# Patient Record
Sex: MALE | Race: OTHER | Employment: FULL TIME | URBAN - METROPOLITAN AREA
[De-identification: names, ages, dates, MRNs, and addresses within clinical notes are randomized per-mention and may not be internally consistent; named-entity substitution may affect disease eponyms.]

---

## 2017-06-04 ENCOUNTER — HOSPITAL ENCOUNTER (OUTPATIENT)
Age: 37
Discharge: HOME | End: 2017-06-04
Payer: COMMERCIAL

## 2017-06-04 VITALS
TEMPERATURE: 98 F | DIASTOLIC BLOOD PRESSURE: 86 MMHG | SYSTOLIC BLOOD PRESSURE: 120 MMHG | RESPIRATION RATE: 16 BRPM | WEIGHT: 200 LBS | OXYGEN SATURATION: 99 % | HEIGHT: 67 IN | BODY MASS INDEX: 31.39 KG/M2 | HEART RATE: 97 BPM

## 2017-06-04 DIAGNOSIS — A09 TRAVELER'S DIARRHEA: Primary | ICD-10-CM

## 2017-06-04 PROCEDURE — 87335 E COLI 0157 AG IA: CPT | Performed by: EMERGENCY MEDICINE

## 2017-06-04 PROCEDURE — 87046 STOOL CULTR AEROBIC BACT EA: CPT | Performed by: EMERGENCY MEDICINE

## 2017-06-04 PROCEDURE — 87045 FECES CULTURE AEROBIC BACT: CPT | Performed by: EMERGENCY MEDICINE

## 2017-06-04 PROCEDURE — 99203 OFFICE O/P NEW LOW 30 MIN: CPT

## 2017-06-04 PROCEDURE — 99204 OFFICE O/P NEW MOD 45 MIN: CPT

## 2017-06-04 PROCEDURE — 87077 CULTURE AEROBIC IDENTIFY: CPT | Performed by: EMERGENCY MEDICINE

## 2017-06-04 RX ORDER — CIPROFLOXACIN 500 MG/1
500 TABLET, FILM COATED ORAL 2 TIMES DAILY
Qty: 6 TABLET | Refills: 0 | Status: SHIPPED | OUTPATIENT
Start: 2017-06-04 | End: 2017-06-07

## 2017-06-04 NOTE — ED INITIAL ASSESSMENT (HPI)
C/o having fever for 2 days Diarrhea for 2 days Denies Abd pain Came back from [place] 5 days ago.  Took Loperamide and paracetamol

## 2017-06-04 NOTE — ED PROVIDER NOTES
Patient Seen in: [facility] In [address] [place] [address] Raheel    History   Patient presents with:  Fever  Nausea/Vomiting/Diarrhea (gastrointestinal)    Stated Complaint: Fever    HPI    Patient is a 48-year-old who returned from [place] 5 days ago and now send a stool culture and treat with Cipro      Disposition and Plan     Clinical Impression:  Traveler's diarrhea  (primary encounter diagnosis)    Disposition:  Discharge    Follow-up:  Cameron Mcintyre MD  [facility]  [phone]

## 2017-06-17 NOTE — PROGRESS NOTES
Quick Note:    Inform patient of result. She will need four more days of cipro. Rx for Ciprofloxacin 500mg PO BID, Disp #8.  Refills 0.  ______

## 2017-08-07 ENCOUNTER — OFFICE VISIT (OUTPATIENT)
Dept: INTERNAL MEDICINE CLINIC | Facility: CLINIC | Age: 37
End: 2017-08-07

## 2017-08-07 ENCOUNTER — APPOINTMENT (OUTPATIENT)
Dept: LAB | Facility: HOSPITAL | Age: 37
End: 2017-08-07
Attending: INTERNAL MEDICINE
Payer: COMMERCIAL

## 2017-08-07 VITALS
WEIGHT: 234 LBS | HEIGHT: 67 IN | DIASTOLIC BLOOD PRESSURE: 78 MMHG | SYSTOLIC BLOOD PRESSURE: 112 MMHG | BODY MASS INDEX: 36.73 KG/M2 | HEART RATE: 88 BPM | TEMPERATURE: 99 F

## 2017-08-07 DIAGNOSIS — Z76.89 ENCOUNTER TO ESTABLISH CARE WITH NEW DOCTOR: Primary | ICD-10-CM

## 2017-08-07 DIAGNOSIS — R10.9 FLANK PAIN: ICD-10-CM

## 2017-08-07 LAB
ANION GAP SERPL CALC-SCNC: 7 MMOL/L (ref 0–18)
BUN SERPL-MCNC: 10 MG/DL (ref 8–20)
BUN/CREAT SERPL: 11 (ref 10–20)
CALCIUM SERPL-MCNC: 9.5 MG/DL (ref 8.5–10.5)
CHLORIDE SERPL-SCNC: 103 MMOL/L (ref 95–110)
CO2 SERPL-SCNC: 27 MMOL/L (ref 22–32)
CREAT SERPL-MCNC: 0.91 MG/DL (ref 0.5–1.5)
GLUCOSE SERPL-MCNC: 110 MG/DL (ref 70–99)
OSMOLALITY UR CALC.SUM OF ELEC: 284 MOSM/KG (ref 275–295)
POTASSIUM SERPL-SCNC: 3.8 MMOL/L (ref 3.3–5.1)
SODIUM SERPL-SCNC: 137 MMOL/L (ref 136–144)

## 2017-08-07 PROCEDURE — 99212 OFFICE O/P EST SF 10 MIN: CPT | Performed by: INTERNAL MEDICINE

## 2017-08-07 PROCEDURE — 36415 COLL VENOUS BLD VENIPUNCTURE: CPT

## 2017-08-07 PROCEDURE — 80048 BASIC METABOLIC PNL TOTAL CA: CPT

## 2017-08-07 PROCEDURE — 99204 OFFICE O/P NEW MOD 45 MIN: CPT | Performed by: INTERNAL MEDICINE

## 2017-08-07 NOTE — PROGRESS NOTES
Raisa Nichols is a 39year old malePatient presents with:  Establish Care      HPI:     Raisa Nichols is a 39year old male who presents for a complete physical exam.     No PMH. No PSH, had root canal.   NKDA.      Returned from travelling to apparent distress  HEENT: normal oropharynx without erythema or exudate, normal TM's  EYES: PERRLA, EOMI, conjunctivae pink  NECK: supple, no cervical or supraclavicular lymphadenopathy, no carotid bruits, no thyromegaly  LUNGS: clear to auscultation  CARD

## 2017-08-08 ENCOUNTER — TELEPHONE (OUTPATIENT)
Dept: INTERNAL MEDICINE CLINIC | Facility: CLINIC | Age: 37
End: 2017-08-08

## 2017-08-08 NOTE — TELEPHONE ENCOUNTER
Please notify of normal kidney function. Would advise that he start up again with exercise and make sure he drinks enough water.  Let me know if this pain occurs again

## 2017-08-08 NOTE — TELEPHONE ENCOUNTER
Notified patient of Dr. Savanna Santamaria message and instructions. Patient verbalized understanding and agreement with plan.

## 2017-11-27 ENCOUNTER — OFFICE VISIT (OUTPATIENT)
Dept: INTERNAL MEDICINE CLINIC | Facility: CLINIC | Age: 37
End: 2017-11-27

## 2017-11-27 VITALS
DIASTOLIC BLOOD PRESSURE: 70 MMHG | SYSTOLIC BLOOD PRESSURE: 116 MMHG | WEIGHT: 234 LBS | BODY MASS INDEX: 36.73 KG/M2 | RESPIRATION RATE: 18 BRPM | OXYGEN SATURATION: 99 % | HEIGHT: 67 IN | TEMPERATURE: 98 F | HEART RATE: 84 BPM

## 2017-11-27 DIAGNOSIS — R42 VERTIGO: ICD-10-CM

## 2017-11-27 DIAGNOSIS — M94.0 COSTOCHONDRITIS: Primary | ICD-10-CM

## 2017-11-27 PROCEDURE — 99212 OFFICE O/P EST SF 10 MIN: CPT | Performed by: INTERNAL MEDICINE

## 2017-11-27 PROCEDURE — 99213 OFFICE O/P EST LOW 20 MIN: CPT | Performed by: INTERNAL MEDICINE

## 2017-11-28 NOTE — PROGRESS NOTES
Mckenzie Ramos is a 44year old male. Patient presents with:  Abdominal Pain      HPI:   Mckenzie Ramos is a 44year old male who presents for: L sided abdominal pain, vertigo    Is having L upper abdominal pain intermittently.  Has not had any epi normal S1S2, no gallops or murmurs  ABD: soft, nontender, nondistended, NABS. No HSM. No epigastric discomfort. MSK: tenderness to palpation of anterior L 10th rib, no crepitus noted. ASSESSMENT AND PLAN:     1.  Costochondritis  Advised ibuprofen 4

## 2017-12-02 ENCOUNTER — LAB ENCOUNTER (OUTPATIENT)
Dept: LAB | Facility: HOSPITAL | Age: 37
End: 2017-12-02
Attending: INTERNAL MEDICINE
Payer: COMMERCIAL

## 2017-12-02 DIAGNOSIS — Z00.00 ROUTINE GENERAL MEDICAL EXAMINATION AT A HEALTH CARE FACILITY: Primary | ICD-10-CM

## 2017-12-02 PROCEDURE — 80061 LIPID PANEL: CPT

## 2017-12-02 PROCEDURE — 82947 ASSAY GLUCOSE BLOOD QUANT: CPT

## 2017-12-02 PROCEDURE — 36415 COLL VENOUS BLD VENIPUNCTURE: CPT

## 2017-12-08 ENCOUNTER — TELEPHONE (OUTPATIENT)
Dept: INTERNAL MEDICINE CLINIC | Facility: CLINIC | Age: 37
End: 2017-12-08

## 2017-12-08 NOTE — TELEPHONE ENCOUNTER
Please notify patient that cholesterol and glucose are ok, but are borderline high. Would recommend that he cut back on carbohydrates (breads, rice, pasta, potatoes, sweets) by 50%.  His cholesterol could be better also, would cut back about 25% on fried or

## 2017-12-11 NOTE — TELEPHONE ENCOUNTER
Patient called back. Relayed Dr Tip Houston message and instructions to him. He verbalized understanding.

## 2018-06-29 NOTE — LETTER
[address]  [address] [facility]  Dept: [phone]  Dept Fax: [phone]: [phone]      Date & Time: 6/20/2017, 9:19 AM      Dear Di Snyder,    We have received test results pertaining DISCHARGE

## 2018-10-15 ENCOUNTER — OFFICE VISIT (OUTPATIENT)
Dept: INTERNAL MEDICINE CLINIC | Facility: CLINIC | Age: 38
End: 2018-10-15
Payer: COMMERCIAL

## 2018-10-15 VITALS
OXYGEN SATURATION: 99 % | BODY MASS INDEX: 37.77 KG/M2 | TEMPERATURE: 98 F | HEART RATE: 88 BPM | SYSTOLIC BLOOD PRESSURE: 104 MMHG | WEIGHT: 235 LBS | DIASTOLIC BLOOD PRESSURE: 74 MMHG | HEIGHT: 66 IN

## 2018-10-15 DIAGNOSIS — Z00.00 ANNUAL PHYSICAL EXAM: Primary | ICD-10-CM

## 2018-10-15 PROCEDURE — 99395 PREV VISIT EST AGE 18-39: CPT | Performed by: INTERNAL MEDICINE

## 2018-10-15 NOTE — PROGRESS NOTES
Supriya Hawkins is a 44year old malePatient presents with:  Physical: annual      HPI:     Supriya Hawkins is a 44year old male who presents for a complete physical exam.     PMH: GERD (takes med PAN D as needed)    No PSH. NKDA.    Social histo thyromegaly  LUNGS: clear to auscultation  CARDIO: RRR, normal S1S2, no gallops or murmurs  GI: soft, NT, ND, NABS, no HSM  EXTREMITIES: no edema, +2 radial & DP pulses bilaterally  NEURO: A&O x 3, moves all 4 extremities normally      ASSESSMENT AND PLAN:

## 2018-10-17 ENCOUNTER — TELEPHONE (OUTPATIENT)
Dept: INTERNAL MEDICINE CLINIC | Facility: CLINIC | Age: 38
End: 2018-10-17

## 2018-10-17 NOTE — TELEPHONE ENCOUNTER
Pt reports drowsiness, tired for 2-3 wks. Comes and goes in the evening. Pt has orders for lipid, cmp.  To Dr. Corinne Bonilla, please advise

## 2018-10-17 NOTE — TELEPHONE ENCOUNTER
Forgot to mention at 10/15 OV drowsiness he experiences in the evenings     Pt would like call back from Dr Memo Goldsmith [phone]

## 2018-10-17 NOTE — TELEPHONE ENCOUNTER
Suspect that this may be related to the weather and that it is getting darker sooner. Also high carb meal in the evening can also make you feel tired sooner.

## 2018-10-21 ENCOUNTER — APPOINTMENT (OUTPATIENT)
Dept: LAB | Facility: HOSPITAL | Age: 38
End: 2018-10-21
Attending: INTERNAL MEDICINE
Payer: COMMERCIAL

## 2018-10-21 DIAGNOSIS — Z00.00 ANNUAL PHYSICAL EXAM: ICD-10-CM

## 2018-10-21 PROCEDURE — 80061 LIPID PANEL: CPT

## 2018-10-21 PROCEDURE — 36415 COLL VENOUS BLD VENIPUNCTURE: CPT

## 2018-10-21 PROCEDURE — 80053 COMPREHEN METABOLIC PANEL: CPT

## 2018-10-24 ENCOUNTER — TELEPHONE (OUTPATIENT)
Dept: INTERNAL MEDICINE CLINIC | Facility: CLINIC | Age: 38
End: 2018-10-24

## 2018-10-24 NOTE — TELEPHONE ENCOUNTER
Please notify patient that labs look good. His HDL is low (this is the good cholesterol and we want this higher). I would encourage him to exercise regularly and that should help. His cholesterol is better than last time, so I think his diet is working.

## 2018-10-24 NOTE — TELEPHONE ENCOUNTER
Nurse spoke to patient to relay message from MD.    Patient verbalized understanding. Patient requested office to mail him his lab results. Nurse put envelope in out going mail at [address].

## 2018-11-01 ENCOUNTER — OFFICE VISIT (OUTPATIENT)
Dept: INTERNAL MEDICINE CLINIC | Facility: CLINIC | Age: 38
End: 2018-11-01
Payer: COMMERCIAL

## 2018-11-01 VITALS
WEIGHT: 238 LBS | HEIGHT: 67 IN | BODY MASS INDEX: 37.35 KG/M2 | DIASTOLIC BLOOD PRESSURE: 82 MMHG | HEART RATE: 84 BPM | TEMPERATURE: 98 F | SYSTOLIC BLOOD PRESSURE: 118 MMHG

## 2018-11-01 DIAGNOSIS — R10.11 COLICKY RUQ ABDOMINAL PAIN: Primary | ICD-10-CM

## 2018-11-01 PROCEDURE — 99214 OFFICE O/P EST MOD 30 MIN: CPT | Performed by: INTERNAL MEDICINE

## 2018-11-01 PROCEDURE — 99212 OFFICE O/P EST SF 10 MIN: CPT | Performed by: INTERNAL MEDICINE

## 2018-11-01 NOTE — PROGRESS NOTES
Darren Broderick is a 44year old male. Patient presents with:  Checkup: right sided chest pain      HPI:   Darren Broderick is a 44year old male who presents for:  R sided chest discomfort. Started having pains about 2 weeks ago.  They hosted s normal S1S2, no gallops or murmurs  Pain not reproducible to palpation  RUQ/epigastric tenderness to palpation with radiation to R scalpula. NABS. Round abdomen. Nondistended. ASSESSMENT AND PLAN:     1.  Colicky RUQ abdominal pain  Pain slightly bett

## 2019-01-16 ENCOUNTER — OFFICE VISIT (OUTPATIENT)
Dept: INTERNAL MEDICINE CLINIC | Facility: CLINIC | Age: 39
End: 2019-01-16
Payer: COMMERCIAL

## 2019-01-16 VITALS
HEART RATE: 98 BPM | SYSTOLIC BLOOD PRESSURE: 112 MMHG | BODY MASS INDEX: 36.91 KG/M2 | WEIGHT: 235.19 LBS | TEMPERATURE: 99 F | OXYGEN SATURATION: 98 % | HEIGHT: 67 IN | DIASTOLIC BLOOD PRESSURE: 80 MMHG

## 2019-01-16 DIAGNOSIS — J06.9 URI, ACUTE: Primary | ICD-10-CM

## 2019-01-16 PROCEDURE — 99212 OFFICE O/P EST SF 10 MIN: CPT | Performed by: INTERNAL MEDICINE

## 2019-01-16 PROCEDURE — 99213 OFFICE O/P EST LOW 20 MIN: CPT | Performed by: INTERNAL MEDICINE

## 2019-01-16 RX ORDER — AZITHROMYCIN 250 MG/1
TABLET, FILM COATED ORAL
Qty: 6 TABLET | Refills: 0 | Status: SHIPPED | OUTPATIENT
Start: 2019-01-16 | End: 2019-12-11

## 2019-01-17 NOTE — PROGRESS NOTES
Antonio Abbott is a 43year old male. HPI:   Patient presents with:  Cold: c/o 99.0 Sunday, productive cough (light green phlegm), no chills, or body aches, n/v/d. Had a sore throat, but not resolved since using lozenges. Tried Lozenges.        43 y ASSESSMENT/PLAN:   URI  Zithromax (Z-kassie) as directed x5d           Orders This Visit:  No orders of the defined types were placed in this encounter.       Meds This Visit:  Requested Prescriptions     Signed Prescriptions Disp Refills   • azithromycin (Z

## 2019-12-09 ENCOUNTER — OFFICE VISIT (OUTPATIENT)
Dept: INTERNAL MEDICINE CLINIC | Facility: CLINIC | Age: 39
End: 2019-12-09
Payer: COMMERCIAL

## 2019-12-09 VITALS
WEIGHT: 232.63 LBS | DIASTOLIC BLOOD PRESSURE: 82 MMHG | TEMPERATURE: 99 F | OXYGEN SATURATION: 98 % | HEIGHT: 64 IN | SYSTOLIC BLOOD PRESSURE: 120 MMHG | HEART RATE: 116 BPM | BODY MASS INDEX: 39.72 KG/M2

## 2019-12-09 DIAGNOSIS — R50.9 FEVER, UNSPECIFIED FEVER CAUSE: ICD-10-CM

## 2019-12-09 DIAGNOSIS — Z00.00 ANNUAL PHYSICAL EXAM: Primary | ICD-10-CM

## 2019-12-09 PROCEDURE — 99396 PREV VISIT EST AGE 40-64: CPT | Performed by: INTERNAL MEDICINE

## 2019-12-09 RX ORDER — OSELTAMIVIR PHOSPHATE 75 MG/1
75 CAPSULE ORAL 2 TIMES DAILY
Qty: 10 CAPSULE | Refills: 0 | Status: SHIPPED | OUTPATIENT
Start: 2019-12-09 | End: 2020-08-13

## 2019-12-09 NOTE — PROGRESS NOTES
Darius Ganser is a 35year old malePatient presents with:  Physical  Cough: Fever, prodcutive cough, body aches x 1 day      HPI:     Darius Ganser is a 35year old male who presents for a complete physical exam.     PMH: GERD (takes med PAN D depression  SKIN: denies lesions, rashes or wounds    EXAM:   /82 (BP Location: Right arm, Patient Position: Sitting, Cuff Size: large)   Pulse 116   Temp 98.8 °F (37.1 °C) (Oral)   Ht 5' 4\" (1.626 m)   Wt 232 lb 9.6 oz (105.5 kg)   SpO2 98%   BMI 3

## 2019-12-11 ENCOUNTER — TELEPHONE (OUTPATIENT)
Dept: INTERNAL MEDICINE CLINIC | Facility: CLINIC | Age: 39
End: 2019-12-11

## 2019-12-11 RX ORDER — BENZONATATE 200 MG/1
200 CAPSULE ORAL 3 TIMES DAILY PRN
Qty: 30 CAPSULE | Refills: 0 | Status: SHIPPED | OUTPATIENT
Start: 2019-12-11 | End: 2020-08-13

## 2019-12-11 NOTE — TELEPHONE ENCOUNTER
Dr. Leona Michael, I spoke with patient and he says he is gradually improving. He did not have a fever today. He is eating ok. He is concerned about his cough. Cough is bad and inconvenient because he feels like he is continually coughing.  He says a prescription fo

## 2019-12-11 NOTE — TELEPHONE ENCOUNTER
rx for tessalon sent; would also recommend cepacol throat lozenges, lots of liquids; he can call if he is not getting better

## 2019-12-14 ENCOUNTER — LAB ENCOUNTER (OUTPATIENT)
Dept: LAB | Facility: HOSPITAL | Age: 39
End: 2019-12-14
Attending: INTERNAL MEDICINE
Payer: COMMERCIAL

## 2019-12-14 DIAGNOSIS — Z12.5 SCREENING FOR PROSTATE CANCER: ICD-10-CM

## 2019-12-14 DIAGNOSIS — Z00.00 ANNUAL PHYSICAL EXAM: Primary | ICD-10-CM

## 2019-12-14 PROCEDURE — 80053 COMPREHEN METABOLIC PANEL: CPT

## 2019-12-14 PROCEDURE — 36415 COLL VENOUS BLD VENIPUNCTURE: CPT

## 2019-12-14 PROCEDURE — 80061 LIPID PANEL: CPT

## 2019-12-14 PROCEDURE — 85025 COMPLETE CBC W/AUTO DIFF WBC: CPT

## 2020-04-10 ENCOUNTER — PATIENT MESSAGE (OUTPATIENT)
Dept: INTERNAL MEDICINE CLINIC | Facility: CLINIC | Age: 40
End: 2020-04-10

## 2020-04-13 ENCOUNTER — TELEPHONE (OUTPATIENT)
Dept: INTERNAL MEDICINE CLINIC | Facility: CLINIC | Age: 40
End: 2020-04-13

## 2020-04-13 ENCOUNTER — PATIENT MESSAGE (OUTPATIENT)
Dept: INTERNAL MEDICINE CLINIC | Facility: CLINIC | Age: 40
End: 2020-04-13

## 2020-04-13 NOTE — TELEPHONE ENCOUNTER
Amy Nguyen   to [place], [place], DO            4/13/20 3:50 AM   Jelani Mccoy,   Good morning.      As an update about my health, on Friday afternoon after having lunch, I got high fever all of the sudden with a temperature of 100 as I mentioned in my p

## 2020-04-13 NOTE — TELEPHONE ENCOUNTER
From: Jani Urbano  To: Bryce Smith DO  Sent: 4/13/2020 3:50 AM CDT  Subject: Other    Hi ,  Good morning.     As an update about my health, on Friday afternoon after having lunch, I got high fever all of the sudden with a temperature of 100 a

## 2020-04-17 ENCOUNTER — PATIENT MESSAGE (OUTPATIENT)
Dept: INTERNAL MEDICINE CLINIC | Facility: CLINIC | Age: 40
End: 2020-04-17

## 2020-04-28 RX ORDER — OMEPRAZOLE 40 MG/1
40 CAPSULE, DELAYED RELEASE ORAL DAILY
Qty: 90 CAPSULE | Refills: 1 | Status: SHIPPED | OUTPATIENT
Start: 2020-04-28 | End: 2020-11-11

## 2020-04-28 RX ORDER — CETIRIZINE HYDROCHLORIDE 10 MG/1
10 TABLET, CHEWABLE ORAL DAILY
Qty: 90 TABLET | Refills: 1 | Status: SHIPPED | OUTPATIENT
Start: 2020-04-28 | End: 2020-11-11

## 2020-08-11 ENCOUNTER — TELEPHONE (OUTPATIENT)
Dept: INTERNAL MEDICINE CLINIC | Facility: CLINIC | Age: 40
End: 2020-08-11

## 2020-08-11 NOTE — TELEPHONE ENCOUNTER
Pt company prescribed him Pantoprazole 40mg  Because Omeprazole is not helping. Was told to check with  if he can take this medication.

## 2020-11-08 NOTE — PATIENT INSTRUCTIONS
- You were seen in clinic for regular annual check-up. Please call your insurance company for coverage of blood work. If covered, we can order blood work for you and you will be a repeat of last year's tests.   –We have refilled your levocetirizine 5 mg d

## 2020-11-08 NOTE — H&P
Festussheri Marsh is a 35year old male. HPI:   Patient presents with:  Physical    Mr. Griselda Link is a 35year old male coming in for annual physical examination. March 20th got sick but improved.  When he goes out and comes home, he has some body aches o Palpitations  Respiratory: Cough, Sputum, Wheezing, Shortness of breath  Gastrointestinal: Nausea, Vomiting, Diarrhea, Constipation, Pain, Heartburn, Dysphagia, Bloody stools, Tarry stools  Genitourinary: Dysmenorrhea, Dysuria, Urinary Frequency, Hematuria cervical/axillary/inguinal LAD      DATA REVIEWED   Labs:  I reviewed the last set of labs from 12/14/2019  - CMP: unremarkable  - Lipid panel: HDL 30  - PSA: 0.29  - CBC WNL      ASSESSMENT/PLAN:       Mr. Nicole Ashley is a 35year old male coming in for annual PM

## 2020-11-11 ENCOUNTER — OFFICE VISIT (OUTPATIENT)
Dept: INTERNAL MEDICINE CLINIC | Facility: CLINIC | Age: 40
End: 2020-11-11
Payer: COMMERCIAL

## 2020-11-11 VITALS
DIASTOLIC BLOOD PRESSURE: 90 MMHG | SYSTOLIC BLOOD PRESSURE: 120 MMHG | TEMPERATURE: 98 F | HEART RATE: 102 BPM | WEIGHT: 247.38 LBS | HEIGHT: 65.9 IN | BODY MASS INDEX: 40.23 KG/M2 | OXYGEN SATURATION: 99 %

## 2020-11-11 DIAGNOSIS — Z00.00 ROUTINE GENERAL MEDICAL EXAMINATION AT A HEALTH CARE FACILITY: Primary | ICD-10-CM

## 2020-11-11 DIAGNOSIS — Z12.5 SPECIAL SCREENING FOR MALIGNANT NEOPLASM OF PROSTATE: ICD-10-CM

## 2020-11-11 DIAGNOSIS — Z13.0 SCREENING FOR IRON DEFICIENCY ANEMIA: ICD-10-CM

## 2020-11-11 DIAGNOSIS — Z13.220 SCREENING FOR LIPOID DISORDERS: ICD-10-CM

## 2020-11-11 PROCEDURE — 3080F DIAST BP >= 90 MM HG: CPT | Performed by: INTERNAL MEDICINE

## 2020-11-11 PROCEDURE — 3074F SYST BP LT 130 MM HG: CPT | Performed by: INTERNAL MEDICINE

## 2020-11-11 PROCEDURE — 3008F BODY MASS INDEX DOCD: CPT | Performed by: INTERNAL MEDICINE

## 2020-11-11 PROCEDURE — 99396 PREV VISIT EST AGE 40-64: CPT | Performed by: INTERNAL MEDICINE

## 2020-11-11 RX ORDER — LEVOCETIRIZINE DIHYDROCHLORIDE 5 MG/1
5 TABLET, FILM COATED ORAL EVERY EVENING
Qty: 30 TABLET | Refills: 3 | Status: SHIPPED | OUTPATIENT
Start: 2020-11-11

## 2020-11-25 ENCOUNTER — LAB ENCOUNTER (OUTPATIENT)
Dept: LAB | Facility: HOSPITAL | Age: 40
End: 2020-11-25
Attending: INTERNAL MEDICINE
Payer: COMMERCIAL

## 2020-11-25 ENCOUNTER — TELEPHONE (OUTPATIENT)
Dept: INTERNAL MEDICINE CLINIC | Facility: CLINIC | Age: 40
End: 2020-11-25

## 2020-11-25 DIAGNOSIS — E78.5 DYSLIPIDEMIA: ICD-10-CM

## 2020-11-25 DIAGNOSIS — R74.8 ELEVATED LIVER ENZYMES: ICD-10-CM

## 2020-11-25 DIAGNOSIS — Z13.0 SCREENING FOR IRON DEFICIENCY ANEMIA: ICD-10-CM

## 2020-11-25 DIAGNOSIS — Z13.220 SCREENING FOR LIPOID DISORDERS: ICD-10-CM

## 2020-11-25 DIAGNOSIS — Z00.00 ROUTINE GENERAL MEDICAL EXAMINATION AT A HEALTH CARE FACILITY: ICD-10-CM

## 2020-11-25 DIAGNOSIS — R73.01 ELEVATED FASTING BLOOD SUGAR: Primary | ICD-10-CM

## 2020-11-25 DIAGNOSIS — R73.01 ELEVATED FASTING BLOOD SUGAR: ICD-10-CM

## 2020-11-25 DIAGNOSIS — Z12.5 SPECIAL SCREENING FOR MALIGNANT NEOPLASM OF PROSTATE: ICD-10-CM

## 2020-11-25 PROCEDURE — 80061 LIPID PANEL: CPT

## 2020-11-25 PROCEDURE — 36415 COLL VENOUS BLD VENIPUNCTURE: CPT

## 2020-11-25 PROCEDURE — 83036 HEMOGLOBIN GLYCOSYLATED A1C: CPT

## 2020-11-25 PROCEDURE — 84153 ASSAY OF PSA TOTAL: CPT

## 2020-11-25 PROCEDURE — 85025 COMPLETE CBC W/AUTO DIFF WBC: CPT

## 2020-11-25 PROCEDURE — 80053 COMPREHEN METABOLIC PANEL: CPT

## 2020-11-25 NOTE — TELEPHONE ENCOUNTER
Brief telephone note: 2 identifiers used    Patient stated he had a late dinner last night around 11 PM, and this may affect his labs. Reviewed the blood work from 11/25/2020  –CMP: Fasting sugar level high at 153, AST 40, ALT 80  –Lipid panel:  Total ch

## 2020-11-27 ENCOUNTER — TELEPHONE (OUTPATIENT)
Dept: INTERNAL MEDICINE CLINIC | Facility: CLINIC | Age: 40
End: 2020-11-27

## 2020-11-27 DIAGNOSIS — E11.9 DIABETES MELLITUS, NEW ONSET (HCC): Primary | ICD-10-CM

## 2020-11-27 RX ORDER — LANCETS 30 GAUGE
EACH MISCELLANEOUS
Qty: 100 EACH | Refills: 3 | Status: SHIPPED | OUTPATIENT
Start: 2020-11-27

## 2020-11-27 RX ORDER — BLOOD-GLUCOSE METER
KIT MISCELLANEOUS
Qty: 1 DEVICE | Refills: 0 | Status: SHIPPED | OUTPATIENT
Start: 2020-11-27

## 2020-11-27 RX ORDER — BLOOD-GLUCOSE METER
KIT MISCELLANEOUS
Qty: 100 STRIP | Refills: 3 | Status: SHIPPED | OUTPATIENT
Start: 2020-11-27

## 2020-11-27 RX ORDER — BLOOD PRESSURE TEST KIT
KIT MISCELLANEOUS
Qty: 120 EACH | Refills: 3 | Status: SHIPPED | OUTPATIENT
Start: 2020-11-27

## 2020-11-27 NOTE — TELEPHONE ENCOUNTER
Brief telephone note: Follow-up with hemoglobin A1c 7.2%. Confirmed date of birth. Patient electing to lower lifestyle modifications including improvement of diet and continued home exercises.   I emphasized the need for targeted weight loss to bring d

## 2021-10-04 NOTE — TELEPHONE ENCOUNTER
I spoke with patient and relayed Dr. Acevedo Seek message. He verbalized understanding. Invited patient to call with any questions or concerns. Quality 226: Preventive Care And Screening: Tobacco Use: Screening And Cessation Intervention: Patient screened for tobacco use and is an ex/non-smoker Quality 130: Documentation Of Current Medications In The Medical Record: Current Medications Documented Quality 431: Preventive Care And Screening: Unhealthy Alcohol Use - Screening: Patient not identified as an unhealthy alcohol user when screened for unhealthy alcohol use using a systematic screening method Detail Level: Detailed Quality 110: Preventive Care And Screening: Influenza Immunization: Influenza Immunization Ordered or Recommended, but not Administered due to system reason

## (undated) NOTE — LETTER
12/9/2019          To Whom It May Concern:    Zehra Almaguer is currently under my medical care and may not return to work at this time. Please excuse Henry Hunt for 4 days. He may return to work on 12/13/19 or sooner if feeling better.       If you re

## (undated) NOTE — LETTER
[address]  [address] [facility]  Dept: [phone]  Dept Fax: [phone]  Loc: [phone]      June 4, 2017    Patient: Laila Borrego   Date of Visit: 6/4/2017       To Whom It May Concern:    See Gutierrez

## (undated) NOTE — ED AVS SNAPSHOT
[facility] AND [facility] in [facility] [address]  [address] [facility]    Phone:  [phone]    Fax:  [phone]           Lenard Mccracken   MRN: [id number]    Department:  [facility] AND [facility] in [address] [place] [address]   Date of Vis Insurance plans vary and the physician(s) referred by the Immediate Care may not be covered by your plan. It is possible that the physician may not participate in your health insurance plan.   This may result in a lower benefit level being available to yo CARE PHYSICIAN AT ONCE OR GO TO THE EMERGENCY DEPARTMENT. If you have been prescribed any medication(s), please fill your prescription right away and begin taking the medication(s) as directed.   If you believe that any of the medications or instructions - If you have concerns related to behavioral health issues or thoughts of harming yourself, contact [facility] at [phone].     - If you don’t have insurance, [facility] has partnered with [facility] [place] [facility]